# Patient Record
Sex: MALE | Race: WHITE | Employment: FULL TIME | URBAN - METROPOLITAN AREA
[De-identification: names, ages, dates, MRNs, and addresses within clinical notes are randomized per-mention and may not be internally consistent; named-entity substitution may affect disease eponyms.]

---

## 2017-02-15 DIAGNOSIS — M17.10 LOCALIZED OSTEOARTHROSIS, LOWER LEG: Primary | ICD-10-CM

## 2017-02-16 ENCOUNTER — TELEPHONE (OUTPATIENT)
Dept: ORTHOPEDIC SURGERY | Age: 62
End: 2017-02-16

## 2017-02-17 ENCOUNTER — TELEPHONE (OUTPATIENT)
Dept: ORTHOPEDIC SURGERY | Age: 62
End: 2017-02-17

## 2017-03-01 ENCOUNTER — OFFICE VISIT (OUTPATIENT)
Dept: ORTHOPEDIC SURGERY | Age: 62
End: 2017-03-01

## 2017-03-01 VITALS
HEIGHT: 76 IN | BODY MASS INDEX: 29.83 KG/M2 | DIASTOLIC BLOOD PRESSURE: 72 MMHG | WEIGHT: 244.93 LBS | HEART RATE: 69 BPM | SYSTOLIC BLOOD PRESSURE: 121 MMHG

## 2017-03-01 DIAGNOSIS — M17.10 LOCALIZED OSTEOARTHROSIS, LOWER LEG: Primary | ICD-10-CM

## 2017-03-01 PROCEDURE — 20610 DRAIN/INJ JOINT/BURSA W/O US: CPT | Performed by: ORTHOPAEDIC SURGERY

## 2017-03-08 ENCOUNTER — OFFICE VISIT (OUTPATIENT)
Dept: ORTHOPEDIC SURGERY | Age: 62
End: 2017-03-08

## 2017-03-08 VITALS
DIASTOLIC BLOOD PRESSURE: 76 MMHG | HEIGHT: 76 IN | BODY MASS INDEX: 29.83 KG/M2 | HEART RATE: 65 BPM | SYSTOLIC BLOOD PRESSURE: 115 MMHG | WEIGHT: 244.93 LBS

## 2017-03-08 DIAGNOSIS — M17.10 LOCALIZED OSTEOARTHROSIS, LOWER LEG: Primary | ICD-10-CM

## 2017-03-08 PROCEDURE — 20610 DRAIN/INJ JOINT/BURSA W/O US: CPT | Performed by: ORTHOPAEDIC SURGERY

## 2017-03-20 ENCOUNTER — OFFICE VISIT (OUTPATIENT)
Dept: ORTHOPEDIC SURGERY | Age: 62
End: 2017-03-20

## 2017-03-20 VITALS
WEIGHT: 244.93 LBS | DIASTOLIC BLOOD PRESSURE: 78 MMHG | HEART RATE: 66 BPM | HEIGHT: 76 IN | BODY MASS INDEX: 29.83 KG/M2 | SYSTOLIC BLOOD PRESSURE: 129 MMHG

## 2017-03-20 DIAGNOSIS — M17.10 LOCALIZED OSTEOARTHROSIS, LOWER LEG: Primary | ICD-10-CM

## 2017-03-20 PROCEDURE — 20610 DRAIN/INJ JOINT/BURSA W/O US: CPT | Performed by: ORTHOPAEDIC SURGERY

## 2017-03-20 RX ORDER — MELOXICAM 15 MG/1
TABLET ORAL
Qty: 90 TABLET | Refills: 3 | Status: SHIPPED | OUTPATIENT
Start: 2017-03-20 | End: 2017-09-25 | Stop reason: SDUPTHER

## 2017-03-20 RX ORDER — MELOXICAM 15 MG/1
TABLET ORAL
Qty: 30 TABLET | Refills: 3 | Status: CANCELLED | OUTPATIENT
Start: 2017-03-20

## 2017-05-22 ENCOUNTER — OFFICE VISIT (OUTPATIENT)
Dept: ORTHOPEDIC SURGERY | Age: 62
End: 2017-05-22

## 2017-05-22 VITALS
HEART RATE: 68 BPM | SYSTOLIC BLOOD PRESSURE: 111 MMHG | DIASTOLIC BLOOD PRESSURE: 79 MMHG | BODY MASS INDEX: 29.83 KG/M2 | HEIGHT: 76 IN | WEIGHT: 244.93 LBS

## 2017-05-22 DIAGNOSIS — M17.10 LOCALIZED OSTEOARTHROSIS, LOWER LEG: Primary | ICD-10-CM

## 2017-05-22 PROCEDURE — 20610 DRAIN/INJ JOINT/BURSA W/O US: CPT | Performed by: ORTHOPAEDIC SURGERY

## 2017-05-22 PROCEDURE — 99213 OFFICE O/P EST LOW 20 MIN: CPT | Performed by: ORTHOPAEDIC SURGERY

## 2017-09-07 DIAGNOSIS — M17.10 LOCALIZED OSTEOARTHROSIS, LOWER LEG: Primary | ICD-10-CM

## 2017-09-14 ENCOUNTER — TELEPHONE (OUTPATIENT)
Dept: ORTHOPEDIC SURGERY | Age: 62
End: 2017-09-14

## 2017-09-15 ENCOUNTER — TELEPHONE (OUTPATIENT)
Dept: ORTHOPEDIC SURGERY | Age: 62
End: 2017-09-15

## 2017-09-25 ENCOUNTER — TELEPHONE (OUTPATIENT)
Dept: ORTHOPEDIC SURGERY | Age: 62
End: 2017-09-25

## 2017-09-25 ENCOUNTER — OFFICE VISIT (OUTPATIENT)
Dept: ORTHOPEDIC SURGERY | Age: 62
End: 2017-09-25

## 2017-09-25 VITALS — WEIGHT: 244.93 LBS | BODY MASS INDEX: 29.83 KG/M2 | HEIGHT: 76 IN

## 2017-09-25 DIAGNOSIS — M17.0 PRIMARY OSTEOARTHRITIS OF BOTH KNEES: ICD-10-CM

## 2017-09-25 DIAGNOSIS — M25.561 PAIN IN BOTH KNEES, UNSPECIFIED CHRONICITY: Primary | ICD-10-CM

## 2017-09-25 DIAGNOSIS — M25.562 PAIN IN BOTH KNEES, UNSPECIFIED CHRONICITY: Primary | ICD-10-CM

## 2017-09-25 PROCEDURE — 20610 DRAIN/INJ JOINT/BURSA W/O US: CPT | Performed by: ORTHOPAEDIC SURGERY

## 2017-09-25 PROCEDURE — 99212 OFFICE O/P EST SF 10 MIN: CPT | Performed by: ORTHOPAEDIC SURGERY

## 2017-09-25 RX ORDER — MELOXICAM 15 MG/1
15 TABLET ORAL
COMMUNITY

## 2017-10-02 ENCOUNTER — OFFICE VISIT (OUTPATIENT)
Dept: ORTHOPEDIC SURGERY | Age: 62
End: 2017-10-02

## 2017-10-02 VITALS — BODY MASS INDEX: 29.83 KG/M2 | WEIGHT: 244.93 LBS | HEIGHT: 76 IN

## 2017-10-02 DIAGNOSIS — M17.0 PRIMARY OSTEOARTHRITIS OF BOTH KNEES: Primary | ICD-10-CM

## 2017-10-02 PROCEDURE — 20610 DRAIN/INJ JOINT/BURSA W/O US: CPT | Performed by: ORTHOPAEDIC SURGERY

## 2017-10-02 NOTE — PROGRESS NOTES
The patient returns today for their second Synvisc injection to the bilateral knee(s) for diagnosis of osteoarthritis. . The risks, benefits, and complications of the injections were again discussed in detail with the patient. The risks discussed included but are not limited to infection, skin reactions, hot swollen joints, and anaphylaxis. The patient gave verbal informed consent for the injection. The patient skin was prepped with iodine the bilateral knee joint(s) was injected with 2 ml of Synvisc intra-articularly under sterile conditions.      The patient tolerated the injection reasonably well. The patient was given instructions to ice the right knee and avoid strenuous activities for 24-48 hours. The patient was instructed to call the office immediately if there is increased pain, redness, warmth, fever, or chills. We will see the patient back in 1 week.     Torsten Pereira MD  [address] [facility] partner of [facility])

## 2017-10-02 NOTE — MR AVS SNAPSHOT
After Visit Summary             Yoana Gilbert [facility]   10/2/2017 8:15 AM   Office Visit    Description:  Male : 1955   Provider:  Gagan Swan MD   Department:  [facility]              Your Follow-Up and Future Appointments         Below is a list of your follow-up and future appointments. This may not be a complete list as you may have made appointments directly with providers that we are not aware of or your providers may have made some for you. Please call your providers to confirm appointments. It is important to keep your appointments. Please bring your current insurance card, photo ID, co-pay, and all medication bottles to your appointment. If self-pay, payment is expected at the time of service. Your To-Do List     Future Appointments Provider Department Dept Phone    10/9/2017 8:45 AM Gagan Swan MD [facility] [phone]         Information from Your Visit        Department     Name Address Phone Fax    [facility] [address] [facility] [address] [phone]      You Were Seen for:         Comments    Primary osteoarthritis of both knees   [[id number]]         Vital Signs     Height Weight Body Mass Index Smoking Status          6' 3.98\" (1.93 m) 244 lb 14.9 oz (111.1 kg) 29.83 kg/m2 Never Smoker        Additional Information about your Body Mass Index (BMI)           Your BMI as listed above is considered overweight (25.0-29.9). BMI is an estimate of body fat, calculated from your height and weight. The higher your BMI, the greater your risk of heart disease, high blood pressure, type 2 diabetes, stroke, gallstones, arthritis, sleep apnea, and certain cancers. BMI is not perfect. It may overestimate body fat in athletes and people who are more muscular. If your body fat is high you can improve your BMI by decreasing your calorie intake and becoming more physically active. Learn more at: [email or web address]uk             Medications and Orders      Your Current Medications Are              fluticasone (VERAMYST) 27.5 MCG/SPRAY nasal spray 2 sprays by Nasal route    meloxicam (MOBIC) 15 MG tablet Take 15 mg by mouth      Allergies              Pcn [Penicillins] Rash    Penicillin G Rash      We Ordered/Performed the following           ME ARTHROCENTESIS ASPIR&/INJ MAJOR JT/BURSA W/O US     ME SYNVISC OR SYNVISC-ONE          Additional Information        Basic Information     Date Of Birth Sex Race Ethnicity Preferred Language    1955 Male White Non-/Non  English      Problem List as of 10/2/2017  Date Reviewed: 10/2/2017                Medial meniscus tear    Lateral meniscus tear    Loose body of left knee    Chondromalacia of left knee    Cervical disc prolapse with radiculopathy    Lumbar radiculopathy    Syringomyelia (HCC)    Cervical spinal stenosis    Muscle weakness of upper extremity    Headache    Localized osteoarthrosis, lower leg    Osteoarthritis of right knee    History of biliary T-tube placement      Preventive Care        Date Due    Hepatitis C screening is recommended for all adults regardless of risk factors born between [facility] at least once (lifetime) who have never been tested. 1955    HIV screening is recommended for all people regardless of risk factors  aged 15-65 years at least once (lifetime) who have never been HIV tested. 7/15/1971    Tetanus Combination Vaccine (1 - Tdap) 7/15/1975    Cholesterol Screening 7/15/1996    Diabetes Screening 7/15/1996    Colonoscopy 7/15/2006    Zoster Vaccine 7/15/2016    Yearly Flu Vaccine (1) 9/1/2017            MyChart Signup           MyChart allows you to send messages to your doctor, view your test results, renew your prescriptions, schedule appointments, view visit notes, and more. How Do I Sign Up? 1.  In your Internet browser, go to [email or web address]  2. Click on the Sign Up Now link in the Sign In box. You will see the New Member Sign Up page. 3. Enter your Get Me Listedt Access Code exactly as it appears below. You will not need to use this code after youve completed the sign-up process. If you do not sign up before the expiration date, you must request a new code. [facility] Access Code: [email or web address]  Expires: 12/1/2017  8:21 AM    4. Enter your Social Security Number ([id number]) and Date of Birth (mm/dd/yyyy) as indicated and click Submit. You will be taken to the next sign-up page. 5. Create a Get Me Listedt ID. This will be your [facility] login ID and cannot be changed, so think of one that is secure and easy to remember. 6. Create a [facility] password. You can change your password at any time. 7. Enter your Password Reset Question and Answer. This can be used at a later time if you forget your password. 8. Enter your e-mail address. You will receive e-mail notification when new information is available in [address] 9. Click Sign Up. You can now view your medical record. Additional Information  If you have questions, please contact the physician practice where you receive care. Remember, [facility] is NOT to be used for urgent needs. For medical emergencies, dial 911. For questions regarding your [employer] account call [phone]. If you have a clinical question, please call your doctor's office.

## 2017-10-09 ENCOUNTER — OFFICE VISIT (OUTPATIENT)
Dept: ORTHOPEDIC SURGERY | Age: 62
End: 2017-10-09

## 2017-10-09 VITALS — WEIGHT: 244.93 LBS | HEIGHT: 76 IN | BODY MASS INDEX: 29.83 KG/M2

## 2017-10-09 DIAGNOSIS — M17.0 PRIMARY OSTEOARTHRITIS OF BOTH KNEES: Primary | ICD-10-CM

## 2017-10-09 PROCEDURE — 20610 DRAIN/INJ JOINT/BURSA W/O US: CPT | Performed by: ORTHOPAEDIC SURGERY

## 2017-10-09 NOTE — PROGRESS NOTES
10/9/17 9:01 AM     Site & comments:   Bilateral knees     Lot number: [id number]    Product:  7638-8573-41 NDC# for synvisc    EXP:

## 2018-03-23 DIAGNOSIS — M17.10 LOCALIZED OSTEOARTHROSIS, LOWER LEG: Primary | ICD-10-CM

## 2018-03-28 ENCOUNTER — TELEPHONE (OUTPATIENT)
Dept: ORTHOPEDIC SURGERY | Age: 63
End: 2018-03-28

## 2018-03-28 NOTE — TELEPHONE ENCOUNTER
03/26/2018  [address]  (SERIES OF 3) BILATERAL KNEES. NO AUTHORIZATION REQUIRED. CAN BUY& BILL. PER LINSEY @ Sylvia adam, REF [id number].   AP

## 2018-04-11 ENCOUNTER — OFFICE VISIT (OUTPATIENT)
Dept: ORTHOPEDIC SURGERY | Age: 63
End: 2018-04-11

## 2018-04-11 VITALS
WEIGHT: 244.93 LBS | HEART RATE: 72 BPM | HEIGHT: 76 IN | BODY MASS INDEX: 29.83 KG/M2 | SYSTOLIC BLOOD PRESSURE: 128 MMHG | DIASTOLIC BLOOD PRESSURE: 80 MMHG

## 2018-04-11 DIAGNOSIS — M17.10 LOCALIZED OSTEOARTHROSIS, LOWER LEG: Primary | ICD-10-CM

## 2018-04-11 PROCEDURE — 20610 DRAIN/INJ JOINT/BURSA W/O US: CPT | Performed by: ORTHOPAEDIC SURGERY

## 2018-04-11 PROCEDURE — 99999 PR OFFICE/OUTPT VISIT,PROCEDURE ONLY: CPT | Performed by: ORTHOPAEDIC SURGERY

## 2018-04-18 ENCOUNTER — OFFICE VISIT (OUTPATIENT)
Dept: ORTHOPEDIC SURGERY | Age: 63
End: 2018-04-18

## 2018-04-18 VITALS
WEIGHT: 244.93 LBS | HEIGHT: 76 IN | SYSTOLIC BLOOD PRESSURE: 109 MMHG | HEART RATE: 64 BPM | BODY MASS INDEX: 29.83 KG/M2 | DIASTOLIC BLOOD PRESSURE: 67 MMHG

## 2018-04-18 DIAGNOSIS — M17.10 LOCALIZED OSTEOARTHROSIS, LOWER LEG: Primary | ICD-10-CM

## 2018-04-18 PROCEDURE — 99999 PR OFFICE/OUTPT VISIT,PROCEDURE ONLY: CPT | Performed by: ORTHOPAEDIC SURGERY

## 2018-04-18 PROCEDURE — 20610 DRAIN/INJ JOINT/BURSA W/O US: CPT | Performed by: ORTHOPAEDIC SURGERY

## 2018-04-25 ENCOUNTER — OFFICE VISIT (OUTPATIENT)
Dept: ORTHOPEDIC SURGERY | Age: 63
End: 2018-04-25

## 2018-04-25 VITALS
HEIGHT: 76 IN | BODY MASS INDEX: 29.83 KG/M2 | HEART RATE: 69 BPM | WEIGHT: 244.93 LBS | DIASTOLIC BLOOD PRESSURE: 73 MMHG | SYSTOLIC BLOOD PRESSURE: 118 MMHG

## 2018-04-25 DIAGNOSIS — M17.10 LOCALIZED OSTEOARTHROSIS, LOWER LEG: Primary | ICD-10-CM

## 2018-04-25 PROCEDURE — 20610 DRAIN/INJ JOINT/BURSA W/O US: CPT | Performed by: ORTHOPAEDIC SURGERY

## 2018-04-25 PROCEDURE — 99999 PR OFFICE/OUTPT VISIT,PROCEDURE ONLY: CPT | Performed by: ORTHOPAEDIC SURGERY

## 2018-05-22 RX ORDER — MELOXICAM 15 MG/1
TABLET ORAL
Qty: 90 TABLET | Refills: 3 | Status: SHIPPED | OUTPATIENT
Start: 2018-05-22

## 2018-05-30 ENCOUNTER — OFFICE VISIT (OUTPATIENT)
Dept: ORTHOPEDIC SURGERY | Age: 63
End: 2018-05-30

## 2018-05-30 VITALS
BODY MASS INDEX: 29.83 KG/M2 | HEIGHT: 76 IN | WEIGHT: 244.93 LBS | SYSTOLIC BLOOD PRESSURE: 117 MMHG | HEART RATE: 64 BPM | DIASTOLIC BLOOD PRESSURE: 68 MMHG

## 2018-05-30 DIAGNOSIS — M17.10 LOCALIZED OSTEOARTHROSIS, LOWER LEG: ICD-10-CM

## 2018-05-30 DIAGNOSIS — M25.562 ACUTE PAIN OF LEFT KNEE: Primary | ICD-10-CM

## 2018-05-30 PROCEDURE — 99212 OFFICE O/P EST SF 10 MIN: CPT | Performed by: ORTHOPAEDIC SURGERY

## 2018-10-18 ENCOUNTER — OFFICE VISIT (OUTPATIENT)
Dept: ORTHOPEDIC SURGERY | Age: 63
End: 2018-10-18
Payer: COMMERCIAL

## 2018-10-18 ENCOUNTER — TELEPHONE (OUTPATIENT)
Dept: ORTHOPEDIC SURGERY | Age: 63
End: 2018-10-18

## 2018-10-18 VITALS — BODY MASS INDEX: 30.32 KG/M2 | HEIGHT: 76 IN | WEIGHT: 249 LBS

## 2018-10-18 DIAGNOSIS — M17.0 PRIMARY OSTEOARTHRITIS OF BOTH KNEES: ICD-10-CM

## 2018-10-18 DIAGNOSIS — M25.561 PAIN IN JOINT OF RIGHT KNEE: Primary | ICD-10-CM

## 2018-10-18 PROCEDURE — 99213 OFFICE O/P EST LOW 20 MIN: CPT | Performed by: ORTHOPAEDIC SURGERY

## 2018-10-18 RX ORDER — ZOLPIDEM TARTRATE 10 MG/1
10 TABLET ORAL
COMMUNITY
Start: 2018-09-20 | End: 2018-10-20

## 2018-10-18 NOTE — PROGRESS NOTES
Chief Complaint  Follow-up (CHAIM KNEES: last seen 10/9/17 chaim knee OA for Synvisc injection. New XR today. LT is worse than RT saw Shl[place]ut back in May for his LT knee.)      History of Present Illness:  Sheri Trujillo is a 59 y.o. y/o male who presents today for follow up of his right and left knees. He has a history of bilateral knee arthritis with his right worsen his left. Recently he has had some lateral sided left knee pain with occasional mechanical symptoms. He notes this with specific motions such as twisting while playing tennis. He denies any major swelling. He has had a history of Visco supplementation injections in both knees in the past most recently in April 2018. He states that they do help. He also continues taking meloxicam.      Medical History  History reviewed. No pertinent past medical history. Past Surgical History:   Procedure Laterality Date    CATARACT REMOVAL      COLONOSCOPY  9/18/14    polyps    KNEE SURGERY      OTHER SURGICAL HISTORY      dental implant       Social History     Social History    Marital status:      Spouse name: N/A    Number of children: N/A    Years of education: N/A     Occupational History    Not on file. Social History Main Topics    Smoking status: Never Smoker    Smokeless tobacco: Never Used    Alcohol use Yes      Comment: social    Drug use: Unknown    Sexual activity: Not on file     Other Topics Concern    Not on file     Social History Narrative    No narrative on file       History reviewed. No pertinent family history. Review of Systems  Pertinent items are noted in HPI  Review of systems reviewed from Patient History Form dated on 9/25/17 and available in the patient's chart under the Media tab. Vital Signs  There were no vitals filed for this visit. General Exam:   Constitutional: Patient is adequately groomed with no evidence of malnutrition  Mental Status: The patient is oriented to time, place and person.   The continue conservative course of anti-inflammatories and activity modification as he is taking meloxicam  -In addition we will submit for approval for Monovisc injections to bilateral knees as he wishes to try a single injection for Visco supplementation  -We will see him back for one of the injection to his right and left knees  -We did discuss the possibility of further imaging is needed, this time we'll continue to treat his symptoms and if there are issues interim he'll contact the office    Torsten Pereira MD  [address] [employer] partner of [facility])      Voice Recognition Dictation disclaimer: Please note that portions of this chart were generated using Dragon dictation software. Although every effort was made to ensure the accuracy of this automated transcription, some errors in transcription may have occurred.

## 2018-11-08 ENCOUNTER — OFFICE VISIT (OUTPATIENT)
Dept: ORTHOPEDIC SURGERY | Age: 63
End: 2018-11-08
Payer: COMMERCIAL

## 2018-11-08 VITALS — BODY MASS INDEX: 30.31 KG/M2 | WEIGHT: 248.9 LBS | HEIGHT: 76 IN

## 2018-11-08 DIAGNOSIS — M17.0 PRIMARY OSTEOARTHRITIS OF BOTH KNEES: Primary | ICD-10-CM

## 2018-11-08 PROCEDURE — 99999 PR OFFICE/OUTPT VISIT,PROCEDURE ONLY: CPT | Performed by: ORTHOPAEDIC SURGERY

## 2018-11-08 PROCEDURE — 20610 DRAIN/INJ JOINT/BURSA W/O US: CPT | Performed by: ORTHOPAEDIC SURGERY

## 2018-11-08 NOTE — PROGRESS NOTES
Injection administration details:    Date :11/8/18TODAY  Site & Comments: bilateral knees administered by Dr Kayden Milan Early Today       1% Lidocaine (10mg/ mL)  # of cc: 6  [address]: 81552-250-92  Lot number: [id number]  EXP: 10/21

## 2018-11-08 NOTE — PROGRESS NOTES
11/8/18 8:59 AM     Site & comments:   BILATERAL KNEES ( LEFT AND RIGHT KNEE)     Lot number: [id number]    Product:  Jac Gr NDC# [address]-35, PRODUCT CODE: [id number]  EXP: 04/30/2021

## 2018-11-08 NOTE — PROGRESS NOTES
Injection administration details:    Date :11/8/18TODAY  Site & Comments: bilateral knees  administered by Dr Eileen Yung Early Today       1% Lidocaine (10mg/ mL)  # of cc: 6  [address]: 28877-633-81  Lot number: [id number]  EXP: 10/21

## 2019-08-13 ENCOUNTER — HOSPITAL ENCOUNTER (OUTPATIENT)
Dept: PULMONOLOGY | Age: 64
Discharge: HOME | End: 2019-08-13
Payer: COMMERCIAL

## 2019-08-13 PROCEDURE — 94070 EVALUATION OF WHEEZING: CPT

## 2019-08-13 PROCEDURE — 94729 DIFFUSING CAPACITY: CPT

## 2019-08-13 PROCEDURE — 94010 BREATHING CAPACITY TEST: CPT

## 2019-08-13 PROCEDURE — 94760 N-INVAS EAR/PLS OXIMETRY 1: CPT

## 2019-08-13 PROCEDURE — 6360000002 HC RX W HCPCS: Performed by: INTERNAL MEDICINE

## 2019-08-13 PROCEDURE — 94726 PLETHYSMOGRAPHY LUNG VOLUMES: CPT

## 2019-08-13 RX ORDER — ALBUTEROL SULFATE 2.5 MG/3ML
2.5 SOLUTION RESPIRATORY (INHALATION) ONCE
Status: COMPLETED | OUTPATIENT
Start: 2019-08-13 | End: 2019-08-13

## 2019-08-13 RX ADMIN — METHACHOLINE CHLORIDE 0.25 MG: 100 POWDER, FOR SOLUTION RESPIRATORY (INHALATION) at 10:24

## 2019-08-13 RX ADMIN — METHACHOLINE CHLORIDE 2.5 MG: 100 POWDER, FOR SOLUTION RESPIRATORY (INHALATION) at 10:29

## 2019-08-13 RX ADMIN — METHACHOLINE CHLORIDE 25 MG: 100 POWDER, FOR SOLUTION RESPIRATORY (INHALATION) at 10:40

## 2019-08-13 RX ADMIN — ALBUTEROL SULFATE 2.5 MG: 2.5 SOLUTION RESPIRATORY (INHALATION) at 10:42

## 2019-08-13 RX ADMIN — METHACHOLINE CHLORIDE 10 MG: 100 POWDER, FOR SOLUTION RESPIRATORY (INHALATION) at 10:35

## 2019-08-14 NOTE — PROCEDURES
[facility] [place] [address]                               PULMONARY FUNCTION    PATIENT NAME: Jasbir Reed                  :        1955  MED REC NO:   [id number]                          ROOM:  ACCOUNT NO:   [de-identified]                           ADMIT DATE: 2019  PROVIDER:     Migdalia Gant MD    DATE OF PROCEDURE:  2019    PFT    Spirometry showed FVC 6.35 L, 111% predicted, FEV1 5.07 L, 118%  predicted, with ratio of 80%. Lung volumes showed reduced ERV, but  were otherwise normal.  Diffusion capacity was normal.    IMPRESSION:  Except for reduced ERV due to body habitus, this is a  normal PFT.         Constantine Silva MD    D: 2019 8:26:23       T: 2019 16:44:47     [email or web address]  Job#: [id number]     Doc#: [id number]    CC:  Jace Cushing, MD

## 2022-09-19 ENCOUNTER — PROCEDURE VISIT (OUTPATIENT)
Dept: SURGERY | Age: 67
End: 2022-09-19
Payer: COMMERCIAL

## 2022-09-19 VITALS — SYSTOLIC BLOOD PRESSURE: 98 MMHG | DIASTOLIC BLOOD PRESSURE: 66 MMHG | TEMPERATURE: 97.2 F | HEART RATE: 84 BPM

## 2022-09-19 DIAGNOSIS — C44.311 BASAL CELL CARCINOMA (BCC) OF LEFT SIDE OF NOSE: Primary | ICD-10-CM

## 2022-09-19 PROCEDURE — 12051 INTMD RPR FACE/MM 2.5 CM/<: CPT | Performed by: DERMATOLOGY

## 2022-09-19 PROCEDURE — 17311 MOHS 1 STAGE H/N/HF/G: CPT | Performed by: DERMATOLOGY

## 2022-09-19 RX ORDER — ZOLPIDEM TARTRATE 10 MG/1
10 TABLET ORAL NIGHTLY PRN
COMMUNITY

## 2022-09-19 NOTE — PROGRESS NOTES
MOHS PROCEDURE NOTE    PHYSICIAN:  Dickson Dubin. Wagner Rush MD, Who operated in two distinct and integrated capacities as the surgeon removing the tissue and as the pathologist examining the tissue. ASSISTANT: Cheryl Ball RN and Santosh Cook RN       REFERRING PROVIDER:  Nia Urias PA-C    PREOPERATIVE DIAGNOSIS: Nodular Basal Cell Carcinoma, Pigmented    SPECIFIC MOHS INDICATIONS:  location and need for tissue conservation    AUC SCORIN/9    POSTOPERATIVE DIAGNOSIS: SAME    LOCATION: Left Nose    OPERATIVE PROCEDURE:  MOHS MICROGRAPHIC SURGERY    RECONSTRUCTION OF DEFECT: Intermediate layered closure    PREOPERATIVE SIZE: 5 x 4 MM    DEFECT SIZE: 8 x 6 MM    LENGTH OF REPAIRED WOUND/SIZE OF FLAP/SIZE OF GRAFT:  14 MM    ANESTHESIA:  2mL 1% lidocaine with epinephrine 1:100,000 buffered. EBL:  MINIMAL    DURATION OF PROCEDURE:  1.5 HOURS    POSTOPERATIVE OBSERVATION: 30 Mins    SPECIMENS:  SEE MOHS MAP    COMPLICATIONS:  NONE    DESCRIPTION OF PROCEDURE:  The patient was given a mirror, as appropriate, and the biopsy site was identified, marked with a surgical marking pen, and verified by the patient. Options for treatment were discussed and the patient was informed that Mohs surgery was the selected treatment based on its lower recurrence rate, given the features listed above, as compared to other treatment modalities such as excision, radiation, or curettage, and agreed with this treatment plan. Risks and benefits including bruising, swelling, bleeding, infection, nerve injury, recurrence, and scarring were discussed with the patient prior to the procedure and a written consent detailing these and other risks was reviewed with the patient and signed. There was a time out for person and procedure verification. The surgical site was prepped with an antiseptic solution. Application of an antiseptic solution was repeated before each surgical stage.       Stage I:  The clinically-apparent tumor was carefully defined and debulked, determining the edge of the surgical excision. A thin layer of tumor-laden tissue was excised with a narrow margin of normal-appearing skin, using the technique of Mohs. A map was prepared to correspond to the area of skin from which it was excised. Hemostasis was achieved using electrosurgery. The wound was bandaged. The tissue was prepared for the cryostat and sectioned. 1 section(s) prepared. Each section was coded, cut, and stained for microscopic examination. The entire base and margins of the excised piece of tissue were examined by the surgeon. The tissue was examined to the level of subcutaneous fat. No tumor was identified at the peripheral margins of stage I of microscopically controlled surgery. DEFECT MANAGEMENT:    REPAIR DESCRIPTION:  Various closure modalities were discussed with the patient, and it was decided that an intermediate layered repair would best preserve normal anatomic and functional relationships. Additional risk of wound dehiscence was discussed. The area was anesthetized with 1% lidocaine with epinephrine 1:100,000 buffered, was given a sterile prep using Chlorhexidine gluconate 4% solution and draped in the usual sterile fashion. Recreation and enlargement of the wound was performed by excising cones of tissue via the triangulation technique. The final incision lines were placed with respect for the patient's natural skin tension lines in a linear configuration to avoid functional and aesthetic distortion of adjacent free margins. Following minimal undermining, meticulous hemostasis was obtained with spot monopolar electrocoagulation. Subcutaneous dead space and dermis were closed using 5-0 Vicryl buried subcutaneous interrupted suture and the epidermis was approximated with 6-0 Prolene running epidermal sutures. WOUND COVERAGE:  .mohsband     There were no complications.   The patient left the Unit in good medical condition. FOLLOW-UP:  The patient will return for suture removal in 7 days.

## 2022-09-19 NOTE — PATIENT INSTRUCTIONS
[facility] Office Hours:    Monday-Thursday  7:30 AM-4:30 PM    Friday  9:00 AM-1:00 PM      POST-OPERATIVE CARE FOR STICHES  Bandage change after 48 hours    CARING FOR YOUR SURGICAL SITE  The bandage should remain on and completely dry for 48 hours. Do NOT get the bandage wet. After the first 48 hours, gently remove the remaining part of the bandage. It can be helpful to moisten the bandage edges in the shower. Steri strips may still be on the wound. It is ok, they will fall off slowly with the daily bandage changes. Gently clean the wound daily with mild soap and water. Try to clean off crust and debris. Dry (pat) the area with a clean Q-tip or gauze. Apply a layer of Vaseline/ Aquaphor (or Bacitracin if your doctor recommends) to the wound area only. Cut a piece of Telfa (or any non-stick dressing) to fit just over the wound and secure it with paper tape. If the wound is small you may use a Band- Aid. Keep area covered for a total of 1 week(s). If the dressing comes off or if you have questions, or concerns about the dressing, please call the office for instructions! POST OPERATIVE INSTRUCTIONS    Activity: Do not lift anything heavier than a gallon of milk for 1 week. Also, avoid strenuous activity such as running, power walking or contact sports. Eating and drinking: Do not drink alcohol for 48 hours after your procedure. Alcohol increases the chances of bleeding. Medicines   -If you have discomfort, take Acetaminophen (Tylenol or Extra Strength Tylenol). Follow the instructions and warning on the bottle. -If your doctor has prescribed you an Aspirin daily, please keep taking it. Do not take extra Aspirin or medicines containing Aspirin (such as Genia-Reading and Excedrin) for 48 hours after your procedure. Bleeding: If bleeding occurs, DO NOT remove the bandage. Put firm pressure on the area with gauze for 20 minutes without peeking.  If the bleeding continues, apply pressure for another 20 minutes. If the bleeding does not stop after you apply pressure, call us right away. If you can not call, go to the nearest emergency room or urgent care facility. What to expect:  You may have these symptoms. They are normal and should get better with time:  Swelling. Swelling usually increases for the first 48 hours after your procedure and then begins to improve. Some soreness and redness around your wound. If we worked close to your eyes (forehead, nose, temple, or upper cheeks) your eyes may become swollen and/ or black and blue. Bruising, which could last 1 week or more. Pink and bumpy appearance to the scar. This may happen a few weeks after your procedure. After 4 weeks, you may gently massage the area each day with facial moisturizer or petroleum jelly (Vaseline or Aquaphor). This will help to smooth the skin and improve the appearance of the scar. The color of your scar will fade over time, but it may be pink for several months after the procedure. The scar may take 6 months to 1 year to reach its final color and appearance. \"Spitting\" suture. Occasionally, an inside suture (stitch) does not completely dissolve. When this happens, (generally 4-8 weeks after surgery), it causes a bump or \"pimple\" to form on the scar. This is easily removed and is not at all serious. It does not mean the skin cancer has returned. Contact us if it happens, but do not be alarmed. Vitamin E oil is NOT necessary. A good moisturizer is just as effective. Sunscreen IS necessary. Use at least and SPF 30 sunscreen daily- even in winter    Call us at [phone] right away if you have any of the following symptoms:  -Bleeding that you can not stop (see highlighted area above). -Pain that lasts longer than 48 hours.  -Your wound becomes more painful, red or hot.  -Swelling that does not begin to improve within the 48 hours or gets worse suddenly.

## 2022-09-20 ENCOUNTER — TELEPHONE (OUTPATIENT)
Dept: SURGERY | Age: 67
End: 2022-09-20

## 2022-09-20 NOTE — TELEPHONE ENCOUNTER
The patient was in the office on 9/19/22 for Mohs located on the L Nose with [facility] repair. The patient tolerated the procedure well and left the office in good condition. A post-operative telephone call was placed at 9/20/2022 at 11:09 AM   in order to check on the patient's recovery process. The patient could not be reached and a voice message could not be left.

## 2022-09-22 ENCOUNTER — TELEPHONE (OUTPATIENT)
Dept: SURGERY | Age: 67
End: 2022-09-22

## 2022-09-22 NOTE — TELEPHONE ENCOUNTER
Pt stated that he had \"white head-like\" bumps where the bandage had been applied. RN asked if there were any pimples along/near the incision itself, pt not near a mirror and was about to start a class. Given Mohs address to send a photo. Told him it sounded like he was possibly having a reaction to bandaids, informed him an RN will review photo once photo is received. Pt aware MD is out of office until Monday, pt is agreeable and calm. Patient verbalizes understanding of all of the above, and has no further questions or concerns at this time. Encouraged to call back the office should any questions/concerns arise.  9/22/2022 at 4:19 PM.

## 2022-09-22 NOTE — TELEPHONE ENCOUNTER
Per Horacio Bailey called this AM with concerns for bumps around his bandage. DOS 9/19/22, ANABEL Nose. Called to discuss, RN left message asking for call back.  9/22/2022 at 1:08 PM

## 2022-09-23 NOTE — TELEPHONE ENCOUNTER
S/O: Patient called office with a concern about \"white bumps\" along incision line and was asked to send in a photo of the area. The photo was received in the [employer] inbox with a time stamp 9/22/22 5:56 pm.    The patient was in the office on 9/19/22 for MOHS procedure located on the Left Nose with [facility] repair with a f/u apt. scheduled 9/26/22 at 1:45 p.m. for suture removal.      A follow-up phone call was made on 9/20/22 however the patient did not answer and a  msg was left. The patient reached out on 9/22/22 with the aforementioned concern. A/P: Today a call was placed to the patient to acknowledge photo receipt/ review as well as check on other s/s the patient may be having. The patient  denies pain at the surgical site, colored drainage, hot to touch or systemic symptoms of infection. It was noted the patient has a mild PCN allergy and when mentioned, the patient stated he hasn't had PCN in decades and could no longer recall the extent of his rash but has been able to take several other antibiotics w/o problems, including Doxycycline. Dr. Kristin Dougherty MD was contacted and upon review of the photo and concern, prescribed Doxycycline 100 mg BID x 5 days, warm compresses and Polysporin to be applied topically. The patient was notified of this plan and stated understanding. He confirmed [facility] pharmacy at [place] as correct location. A separate call note is made about the pharmacy call. No further action needed at this time.

## 2022-09-26 ENCOUNTER — OFFICE VISIT (OUTPATIENT)
Dept: SURGERY | Age: 67
End: 2022-09-26

## 2022-09-26 DIAGNOSIS — Z48.02 VISIT FOR SUTURE REMOVAL: Primary | ICD-10-CM

## 2022-09-26 PROCEDURE — 99024 POSTOP FOLLOW-UP VISIT: CPT | Performed by: DERMATOLOGY

## 2022-09-26 RX ORDER — DOXYCYCLINE HYCLATE 100 MG/1
CAPSULE ORAL
COMMUNITY
Start: 2022-09-23

## 2022-09-26 NOTE — PROGRESS NOTES
S:  The patient is here for suture removal s/p Mohs surgery on the left nose and Intermediate layered closure repair, 1 week(s) ago. The site appears well-healed without signs of infection (redness, pain or discharge). The sutures were removed. The pt had called Thursday c/o white bumps and redness along scar. Keflex 500mg tid called in Friday and pt states bumps have resolved. No pain. Pt should complete the 5 day course of keflex. Wound care and activity instructions given. The patient was scheduled for follow-up prn for scar/wound check. The patient was scheduled for f/u with General Dermatology per their instructions. Ced Mandujano RN, am scribing for and in the presence of Dr. Дмитрий Rondon. Electronically signed by Neftali Nj RN on 9/26/2022 at 2:13 PM     Electronically signed by Nisreen Gonzalez MD on 9/26/2022 at 2:17 PM    I, Paddy Peck MD,  personally performed the services described in this documentation as scribed by Neftali Nj RN  in my presence, and it is both accurate and complete.